# Patient Record
Sex: MALE | Race: BLACK OR AFRICAN AMERICAN | NOT HISPANIC OR LATINO | URBAN - METROPOLITAN AREA
[De-identification: names, ages, dates, MRNs, and addresses within clinical notes are randomized per-mention and may not be internally consistent; named-entity substitution may affect disease eponyms.]

---

## 2020-09-21 ENCOUNTER — EMERGENCY (EMERGENCY)
Facility: HOSPITAL | Age: 25
LOS: 1 days | Discharge: HOME | End: 2020-09-21
Attending: EMERGENCY MEDICINE
Payer: COMMERCIAL

## 2020-09-21 VITALS
HEIGHT: 71 IN | TEMPERATURE: 98 F | DIASTOLIC BLOOD PRESSURE: 82 MMHG | SYSTOLIC BLOOD PRESSURE: 134 MMHG | WEIGHT: 160.06 LBS | RESPIRATION RATE: 18 BRPM | OXYGEN SATURATION: 98 % | HEART RATE: 66 BPM

## 2020-09-21 PROCEDURE — 73030 X-RAY EXAM OF SHOULDER: CPT

## 2020-09-21 PROCEDURE — 72070 X-RAY EXAM THORAC SPINE 2VWS: CPT

## 2020-09-21 PROCEDURE — 99053 MED SERV 10PM-8AM 24 HR FAC: CPT

## 2020-09-21 PROCEDURE — 72070 X-RAY EXAM THORAC SPINE 2VWS: CPT | Mod: 26

## 2020-09-21 PROCEDURE — 71046 X-RAY EXAM CHEST 2 VIEWS: CPT | Mod: 26

## 2020-09-21 PROCEDURE — 99284 EMERGENCY DEPT VISIT MOD MDM: CPT | Mod: 25

## 2020-09-21 PROCEDURE — 71046 X-RAY EXAM CHEST 2 VIEWS: CPT

## 2020-09-21 PROCEDURE — 99284 EMERGENCY DEPT VISIT MOD MDM: CPT

## 2020-09-21 PROCEDURE — 73030 X-RAY EXAM OF SHOULDER: CPT | Mod: 26,RT

## 2020-09-21 RX ORDER — IBUPROFEN 200 MG
600 TABLET ORAL ONCE
Refills: 0 | Status: DISCONTINUED | OUTPATIENT
Start: 2020-09-21 | End: 2020-09-21

## 2020-09-21 RX ORDER — ACETAMINOPHEN 500 MG
650 TABLET ORAL ONCE
Refills: 0 | Status: COMPLETED | OUTPATIENT
Start: 2020-09-21 | End: 2020-09-21

## 2020-09-21 RX ORDER — METHOCARBAMOL 500 MG/1
750 TABLET, FILM COATED ORAL ONCE
Refills: 0 | Status: COMPLETED | OUTPATIENT
Start: 2020-09-21 | End: 2020-09-21

## 2020-09-21 RX ORDER — LIDOCAINE 4 G/100G
1 CREAM TOPICAL ONCE
Refills: 0 | Status: COMPLETED | OUTPATIENT
Start: 2020-09-21 | End: 2020-09-21

## 2020-09-21 RX ADMIN — LIDOCAINE 1 PATCH: 4 CREAM TOPICAL at 22:58

## 2020-09-21 RX ADMIN — Medication 650 MILLIGRAM(S): at 22:58

## 2020-09-21 RX ADMIN — METHOCARBAMOL 750 MILLIGRAM(S): 500 TABLET, FILM COATED ORAL at 22:58
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
## 2020-09-21 NOTE — ED PROVIDER NOTE - PHYSICAL EXAMINATION
Vitals: Noted, see flow sheet.  General: Well appearing, NAD, non-toxic.   HEENT: NC/AT. EOMI, PERRL, no nystagmus, no raccoon eyes, no mercado sign.  No otorrhea, no hemotympanum. No epistaxis. No intraoral injury  Neck: Soft/supple, no midline TTP, FROM without pain. Trachea midline.   Back: No CVAT, no flank tenderness. No bony midline spinal TTP. Right paraspinal thoracic and lumbar ttp  Cardiac: RRR, +S1/S2.  Pulses 2+ and symmetric b/l.    Chest: Speaking in full sentences.  Chest wall stable and non-tender to palpation without ecchymosis, seatbelt sign negative.  Expansion symmetrical, lungs CTA b/l, no wheezes/rhonchi/rales/stridor.  Abdomen: BSx4. Soft, NTND, no rebound tenderness or guarding. No ecchymosis or external signs of abdominal trauma. Pelvis stable.  Extremities: Atraumatic with FROM upper/lower extremities, no localized bony tenderness. Straight leg raise negative.  Neuro: Awake, alert and oriented to person/place/time/situation. Speech clear, no focal deficits, no facial droop  Follows commands appropriately.  Sensation intact b/l no deficits,  strong and equal.  Strength 5/5 symmetrical upper/lower extremities.  CN II-XII intact. No dysmetria. Ambulatory independently, No ataxic gait, no drift.  Psych: Flat affect

## 2020-09-21 NOTE — ED PROVIDER NOTE - CARE PROVIDERS DIRECT ADDRESSES
[email or web address]

## 2020-09-21 NOTE — ED PROVIDER NOTE - CARE PROVIDER_API CALL
Jesus Yoo  [facility]  [address]  [place] [address]  Phone: [phone]  Fax: [phone]  Follow Up Time: 4-6 Days

## 2020-09-21 NOTE — ED PROVIDER NOTE - PATIENT PORTAL LINK FT
You can access the FollowMyHealth Patient Portal offered by [facility] by registering at the following website: [email or web address]. By joining [facility]’s FollowMyHealth portal, you will also be able to view your health information using other applications (apps) compatible with our system.
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
## 2020-09-21 NOTE — ED PROVIDER NOTE - ATTENDING CONTRIBUTION TO CARE
The patient seen and examined    Shoulder Pain    I, Van Farr, performed the initial face to face bedside interview with this patient regarding history of present illness, review of symptoms and relevant past medical, social and family history.  I completed an independent physical examination.  I was the initial provider who evaluated this patient. I have signed out the follow up of any pending tests (i.e. labs, radiological studies) to the ACP.  I have communicated the patient’s plan of care and disposition with the ACP.

## 2020-09-21 NOTE — ED PROVIDER NOTE - PROGRESS NOTE DETAILS
VALERIE Wells NOTE: Pt refusing CT scan, states he "doesn't need it," I explained medical necessity and offered anxiolytics, pt refused, wants to go home.  I had a lengthy discussion with patient and his father, and the patient wishes to leave at this time. The patient understands that he is leaving against medical advice despite the risk of missing a potential serious diagnosis which may lead to injury, disability and/or death. I discussed with the patient which tests would need to be performed and what type of monitoring would be necessary for the patient as well. I was unable to convince the patient to stay for further work-up. The patient is alert and oriented to person/place/time/situation, demonstrates competence in making medical decisions, and verbalizes understanding and able to repeat back risks including but not limited to worsening condition and death. VALERIE Wells NOTE: Pt refusing CT scan, states he "doesn't need it," I explained medical necessity and offered anxiolytics, pt refused, wants to go home. Pt eating fast food and texting on cell phone  I had a lengthy discussion with patient and his father, and the patient wishes to leave at this time. The patient understands that he is leaving against medical advice despite the risk of missing a potential serious diagnosis which may lead to injury, disability and/or death. I discussed with the patient which tests would need to be performed and what type of monitoring would be necessary for the patient as well. I was unable to convince the patient to stay for further work-up. The patient is alert and oriented to person/place/time/situation, demonstrates competence in making medical decisions, and verbalizes understanding and able to repeat back risks including but not limited to worsening condition and death.  Sling placed, will advise orthopedist follow up.  Discussed results, plan and return precautions with patient and father, pt and father verbalized understanding and agreement of plan VALERIE Wells NOTE: Pt refusing CT scan, states he "doesn't need it," I explained medical necessity and offered anxiolytics, pt refused, wants to go home. Pt eating fast food and texting on cell phone.  I had a lengthy discussion with patient and his father, and the patient wishes to leave at this time. The patient understands that he is leaving against medical advice despite the risk of missing a potential serious diagnosis which may lead to injury, disability and/or death. I discussed with the patient which tests would need to be performed and what type of monitoring would be necessary for the patient as well. I was unable to convince the patient to stay for further work-up. The patient is alert and oriented to person/place/time/situation, demonstrates competence in making medical decisions, and verbalizes understanding.  Sling placed, will advise orthopedist follow up.  Discussed results, plan and return precautions with patient and father, pt and father verbalized understanding and agreement of plan

## 2021-06-30 NOTE — ED PROVIDER NOTE - PRINCIPAL DIAGNOSIS
Addended by: Shannon Vaughn on: 6/30/2021 04:04 PM     Modules accepted: Orders
Shoulder pain, right